# Patient Record
Sex: FEMALE | Race: BLACK OR AFRICAN AMERICAN | NOT HISPANIC OR LATINO | Employment: FULL TIME | URBAN - METROPOLITAN AREA
[De-identification: names, ages, dates, MRNs, and addresses within clinical notes are randomized per-mention and may not be internally consistent; named-entity substitution may affect disease eponyms.]

---

## 2017-11-30 ENCOUNTER — APPOINTMENT (OUTPATIENT)
Dept: GENERAL RADIOLOGY | Facility: CLINIC | Age: 32
End: 2017-11-30
Attending: EMERGENCY MEDICINE
Payer: COMMERCIAL

## 2017-11-30 ENCOUNTER — APPOINTMENT (OUTPATIENT)
Dept: CT IMAGING | Facility: CLINIC | Age: 32
End: 2017-11-30
Attending: EMERGENCY MEDICINE
Payer: COMMERCIAL

## 2017-11-30 ENCOUNTER — HOSPITAL ENCOUNTER (EMERGENCY)
Facility: CLINIC | Age: 32
Discharge: HOME | End: 2017-11-30
Attending: EMERGENCY MEDICINE | Admitting: EMERGENCY MEDICINE
Payer: COMMERCIAL

## 2017-11-30 VITALS
TEMPERATURE: 97.8 F | WEIGHT: 160 LBS | RESPIRATION RATE: 16 BRPM | OXYGEN SATURATION: 100 % | DIASTOLIC BLOOD PRESSURE: 64 MMHG | SYSTOLIC BLOOD PRESSURE: 100 MMHG

## 2017-11-30 DIAGNOSIS — T78.40XA ALLERGIC REACTION, INITIAL ENCOUNTER: ICD-10-CM

## 2017-11-30 DIAGNOSIS — N76.0 BACTERIAL VAGINOSIS: ICD-10-CM

## 2017-11-30 DIAGNOSIS — B96.89 BACTERIAL VAGINOSIS: ICD-10-CM

## 2017-11-30 DIAGNOSIS — R07.89 ATYPICAL CHEST PAIN: ICD-10-CM

## 2017-11-30 LAB
ANION GAP SERPL CALCULATED.3IONS-SCNC: 9 MMOL/L (ref 3–14)
BASOPHILS # BLD AUTO: 0.1 10E9/L (ref 0–0.2)
BASOPHILS NFR BLD AUTO: 0.5 %
BUN SERPL-MCNC: 18 MG/DL (ref 7–30)
CALCIUM SERPL-MCNC: 8.6 MG/DL (ref 8.5–10.1)
CHLORIDE SERPL-SCNC: 104 MMOL/L (ref 94–109)
CO2 SERPL-SCNC: 23 MMOL/L (ref 20–32)
CREAT SERPL-MCNC: 0.65 MG/DL (ref 0.52–1.04)
D DIMER PPP FEU-MCNC: 0.7 UG/ML FEU (ref 0–0.5)
DIFFERENTIAL METHOD BLD: NORMAL
EOSINOPHIL # BLD AUTO: 0.2 10E9/L (ref 0–0.7)
EOSINOPHIL NFR BLD AUTO: 2 %
ERYTHROCYTE [DISTWIDTH] IN BLOOD BY AUTOMATED COUNT: 12.4 % (ref 10–15)
GFR SERPL CREATININE-BSD FRML MDRD: >90 ML/MIN/1.7M2
GLUCOSE SERPL-MCNC: 77 MG/DL (ref 70–99)
HCG UR QL: NEGATIVE
HCT VFR BLD AUTO: 41.5 % (ref 35–47)
HGB BLD-MCNC: 14.4 G/DL (ref 11.7–15.7)
IMM GRANULOCYTES # BLD: 0 10E9/L (ref 0–0.4)
IMM GRANULOCYTES NFR BLD: 0.3 %
INTERNAL QC OK POCT: YES
INTERPRETATION ECG - MUSE: NORMAL
LYMPHOCYTES # BLD AUTO: 2.1 10E9/L (ref 0.8–5.3)
LYMPHOCYTES NFR BLD AUTO: 21 %
MCH RBC QN AUTO: 29.8 PG (ref 26.5–33)
MCHC RBC AUTO-ENTMCNC: 34.7 G/DL (ref 31.5–36.5)
MCV RBC AUTO: 86 FL (ref 78–100)
MONOCYTES # BLD AUTO: 0.6 10E9/L (ref 0–1.3)
MONOCYTES NFR BLD AUTO: 5.9 %
NEUTROPHILS # BLD AUTO: 6.9 10E9/L (ref 1.6–8.3)
NEUTROPHILS NFR BLD AUTO: 70.3 %
NRBC # BLD AUTO: 0 10*3/UL
NRBC BLD AUTO-RTO: 0 /100
PLATELET # BLD AUTO: 318 10E9/L (ref 150–450)
POTASSIUM SERPL-SCNC: 3.3 MMOL/L (ref 3.4–5.3)
RBC # BLD AUTO: 4.84 10E12/L (ref 3.8–5.2)
SODIUM SERPL-SCNC: 136 MMOL/L (ref 133–144)
TROPONIN I SERPL-MCNC: <0.015 UG/L (ref 0–0.04)
TROPONIN I SERPL-MCNC: <0.015 UG/L (ref 0–0.04)
WBC # BLD AUTO: 9.7 10E9/L (ref 4–11)

## 2017-11-30 PROCEDURE — 71260 CT THORAX DX C+: CPT

## 2017-11-30 PROCEDURE — 80048 BASIC METABOLIC PNL TOTAL CA: CPT | Performed by: EMERGENCY MEDICINE

## 2017-11-30 PROCEDURE — 84484 ASSAY OF TROPONIN QUANT: CPT | Performed by: EMERGENCY MEDICINE

## 2017-11-30 PROCEDURE — 25000132 ZZH RX MED GY IP 250 OP 250 PS 637: Performed by: EMERGENCY MEDICINE

## 2017-11-30 PROCEDURE — 25000128 H RX IP 250 OP 636: Performed by: EMERGENCY MEDICINE

## 2017-11-30 PROCEDURE — 71020 XR CHEST 2 VW: CPT

## 2017-11-30 PROCEDURE — 81025 URINE PREGNANCY TEST: CPT | Performed by: EMERGENCY MEDICINE

## 2017-11-30 PROCEDURE — 25000125 ZZHC RX 250: Performed by: EMERGENCY MEDICINE

## 2017-11-30 PROCEDURE — 85025 COMPLETE CBC W/AUTO DIFF WBC: CPT | Performed by: EMERGENCY MEDICINE

## 2017-11-30 PROCEDURE — 99285 EMERGENCY DEPT VISIT HI MDM: CPT | Mod: 25 | Performed by: EMERGENCY MEDICINE

## 2017-11-30 PROCEDURE — 85379 FIBRIN DEGRADATION QUANT: CPT | Performed by: EMERGENCY MEDICINE

## 2017-11-30 PROCEDURE — 93005 ELECTROCARDIOGRAM TRACING: CPT | Performed by: EMERGENCY MEDICINE

## 2017-11-30 PROCEDURE — 96374 THER/PROPH/DIAG INJ IV PUSH: CPT | Mod: 59 | Performed by: EMERGENCY MEDICINE

## 2017-11-30 RX ORDER — IOPAMIDOL 755 MG/ML
100 INJECTION, SOLUTION INTRAVASCULAR ONCE
Status: COMPLETED | OUTPATIENT
Start: 2017-11-30 | End: 2017-11-30

## 2017-11-30 RX ORDER — EPINEPHRINE 0.3 MG/.3ML
0.3 INJECTION SUBCUTANEOUS
Qty: 0.6 ML | Refills: 0 | Status: SHIPPED | OUTPATIENT
Start: 2017-11-30

## 2017-11-30 RX ORDER — DIPHENHYDRAMINE HCL 50 MG
50 CAPSULE ORAL ONCE
Status: COMPLETED | OUTPATIENT
Start: 2017-11-30 | End: 2017-11-30

## 2017-11-30 RX ORDER — DIPHENHYDRAMINE HCL 25 MG
25-50 TABLET ORAL EVERY 6 HOURS PRN
Qty: 56 TABLET | Refills: 0 | Status: SHIPPED | OUTPATIENT
Start: 2017-11-30

## 2017-11-30 RX ORDER — CLINDAMYCIN HCL 300 MG
300 CAPSULE ORAL 2 TIMES DAILY
Qty: 14 CAPSULE | Refills: 0 | Status: SHIPPED | OUTPATIENT
Start: 2017-11-30 | End: 2017-12-07

## 2017-11-30 RX ORDER — KETOROLAC TROMETHAMINE 30 MG/ML
30 INJECTION, SOLUTION INTRAMUSCULAR; INTRAVENOUS ONCE
Status: COMPLETED | OUTPATIENT
Start: 2017-11-30 | End: 2017-11-30

## 2017-11-30 RX ADMIN — IOPAMIDOL 54 ML: 755 INJECTION, SOLUTION INTRAVENOUS at 05:46

## 2017-11-30 RX ADMIN — SODIUM CHLORIDE 75 ML: 9 INJECTION, SOLUTION INTRAVENOUS at 05:47

## 2017-11-30 RX ADMIN — KETOROLAC TROMETHAMINE 30 MG: 30 INJECTION, SOLUTION INTRAMUSCULAR at 03:32

## 2017-11-30 RX ADMIN — DIPHENHYDRAMINE HYDROCHLORIDE 50 MG: 50 CAPSULE ORAL at 01:51

## 2017-11-30 ASSESSMENT — ENCOUNTER SYMPTOMS
VOMITING: 0
FEVER: 0
NAUSEA: 1
COUGH: 0
SHORTNESS OF BREATH: 0

## 2017-11-30 NOTE — ED NOTES
Started around 2030 with rash and felt like tongue was swollen and itching inside of mouth.  Also noticed increased work of breathing and chest pain.   Just started Flagyl for pelvic infection.

## 2017-11-30 NOTE — ED PROVIDER NOTES
History   No chief complaint on file.    HPI  Gary Thakkar is a 32 year old female who presents to the Emergency Department  today with concern for allergic reaction.  She states around 8:30 PM, approximately 5 hours prior to my seeing her, she suddenly felt itching and in her tongue and throat, as well as diffusely all over her body.  She states she developed a diffuse bodily rash.  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  She denies any new chemical exposures tonight.  However, she did just take metronidazole at 7 PM tonight for the first time, for what sounds like a bacterial vaginosis vaginal infection.  She states she also developed a little bit of left-sided chest pain and some nausea. She attempted to vomit but had dry heaves.  She states that the rash is nearly completely gone, and the itching dramatically improved. She states her throat feels back to normal as does the mouth.  However, the chest pain and continues mildly.  No shortness of breath, coughing, or fever.  No other complaints this time.    This part of the medical record was transcribed by Patti Su Scribe, from a dictation done by Loren Babb MD.     PAST MEDICAL HISTORY  Past Medical History:   Diagnosis Date     Migraines      PAST SURGICAL HISTORY  History reviewed. No pertinent surgical history.  FAMILY HISTORY  No family history on file.  SOCIAL HISTORY  Social History   Substance Use Topics     Smoking status: Never Smoker     Smokeless tobacco: Never Used     Alcohol use Yes      Comment: socially     MEDICATIONS  No current facility-administered medications for this encounter.      Current Outpatient Prescriptions   Medication     TOPIRAMATE PO     METRONIDAZOLE PO     clindamycin (CLEOCIN) 300 MG capsule     diphenhydrAMINE (BENADRYL) 25 MG tablet     EPINEPHrine (EPIPEN/ADRENACLICK/OR ANY BX GENERIC EQUIV) 0.3 MG/0.3ML injection 2-pack     ALLERGIES  No Known Allergies      I have reviewed the  Medications, Allergies, Past Medical and Surgical History, and Social History in the Epic system.    Review of Systems   Constitutional: Negative for fever.   Respiratory: Negative for cough and shortness of breath.    Cardiovascular: Positive for chest pain (mild chest discomfort).   Gastrointestinal: Positive for nausea. Negative for vomiting.   Skin: Positive for rash (diffuse all over body; now nearly completely resolved).   All other systems reviewed and are negative.      Physical Exam   BP: 119/80  Heart Rate: 104  Temp: 98  F (36.7  C)  Resp: 20  Weight: 72.6 kg (160 lb)  SpO2: 97 %      Physical Exam   Constitutional: No distress.   HENT:   Head: Atraumatic.   Mouth/Throat: Oropharynx is clear and moist. No oropharyngeal exudate.   Eyes: Pupils are equal, round, and reactive to light. No scleral icterus.   Cardiovascular: Normal heart sounds and intact distal pulses.    Pulmonary/Chest: Breath sounds normal. No respiratory distress. She exhibits tenderness.       Abdominal: Soft. Bowel sounds are normal. There is no tenderness.   Musculoskeletal: She exhibits no edema or tenderness.   Skin: Skin is warm. Rash (very faint (minimally visible) splotchy erythema on trunk) noted. She is not diaphoretic.       ED Course     ED Course     Procedures             EKG Interpretation:      Interpreted by Loren Babb  Time reviewed: 0100  Symptoms at time of EKG: chest pain   Rhythm: normal sinus   Rate: 92  Axis: Normal  Ectopy: none  Conduction: normal  ST Segments/ T Waves: T wave inversion V2  Q Waves: isolated to lead III  Comparison to prior: No old EKG available    Clinical Impression: NSR, S1/Q3/T3, as well as T wave inversion in V2                  Critical Care time:  none           Labs Ordered and Resulted from Time of ED Arrival Up to the Time of Departure from the ED   BASIC METABOLIC PANEL - Abnormal; Notable for the following:        Result Value    Potassium 3.3 (*)     All other components  within normal limits   D DIMER QUANTITATIVE - Abnormal; Notable for the following:     D Dimer 0.7 (*)     All other components within normal limits   HCG QUAL URINE POCT - Normal   CBC WITH PLATELETS DIFFERENTIAL   TROPONIN I   TROPONIN I            Assessments & Plan (with Medical Decision Making)   The patient states that the itching in her throat is gone.  Her oropharyngeal exam is normal.  She has a barely visible blotchy rash, that I feel is likely the remnants of some hives.  She states it was much worse prior to coming in.  He seems to be going away without doing anything.  I did give her some Benadryl with complete resolution of her symptoms.  She initially came in minimally tachycardia, this resolved.  She also did have some left-sided chest pain which she stated was nonpleuritic.  It was reproducible with palpation.  Chest x-ray was done which was unremarkable.  She has no cardiac risk factors, as she has no underlying medical history with exception of migraines, and is a nonsmoker.  She does not use OCPs, but does use Depo-Provera Provera for contraception.  She does state she's had some bilateral calf discomfort recently.  No history of DVT or PE, recent surgery, or travel.  EKG was done which showed T-wave inversion isolated in V2, with some flattening in V3 area and she does have an S1, Q3, T3.  Because of this, d-dimer was ordered, and it was positive at 0.7. Because of this, CT was done, which was neg. She will be d/c home at this point.  Chest pain is much improved after a dose of Toradol here.  My concern for serious cause for her chest pain at this point is very low.  She'll be given a prescription for Benadryl, as well as an EpiPen.  She is instructed on indications for use of EpiPen, as well as the fact that she needs to return to the ER should she feel she has to use this.  She is directed to follow up with her clinic doctor within the next few days.  I will discontinue the metronidazole and  change this to oral clindamycin, as it certainly is possible the metronidazole has caused the rash.  The patient verbalizes understanding and is agreeable with plan.    I have reviewed the nursing notes.    I have reviewed the findings, diagnosis, plan and need for follow up with the patient.    New Prescriptions    CLINDAMYCIN (CLEOCIN) 300 MG CAPSULE    Take 1 capsule (300 mg) by mouth 2 times daily for 7 days    DIPHENHYDRAMINE (BENADRYL) 25 MG TABLET    Take 1-2 tablets (25-50 mg) by mouth every 6 hours as needed for itching or other (rash)    EPINEPHRINE (EPIPEN/ADRENACLICK/OR ANY BX GENERIC EQUIV) 0.3 MG/0.3ML INJECTION 2-PACK    Inject 0.3 mLs (0.3 mg) into the muscle once as needed for anaphylaxis       Final diagnoses:   Allergic reaction, initial encounter   Atypical chest pain   Bacterial vaginosis       11/30/2017   [facility], [place], EMERGENCY DEPARTMENT     Loren Babb MD  11/30/17 0616

## 2017-11-30 NOTE — DISCHARGE INSTRUCTIONS
Stop the metronidazole, and start the clindamycin for your infection. If your itching or rash returns, use the benadryl. Use the epipen only if you have swelling in the mouth or throat, or feel short of breath. Return to the ER if you have any worsening or concerns, or if you need to use the epipen. See your doctor in 1-4 days.

## 2017-11-30 NOTE — ED AVS SNAPSHOT
[facility], [place], Emergency Department    [address]    [place] [address]    Phone:  [phone]    Fax:  [phone]                                       Gary Thakkar   MRN: [id number]    Department:  [facility], Emergency Department   Date of Visit:  11/30/2017           After Visit Summary Signature Page     I have received my discharge instructions, and my questions have been answered. I have discussed any challenges I see with this plan with the nurse or doctor.    ..........................................................................................................................................  Patient/Patient Representative Signature      ..........................................................................................................................................  Patient Representative Print Name and Relationship to Patient    ..................................................               ................................................  Date                                            Time    ..........................................................................................................................................  Reviewed by Signature/Title    ...................................................              ..............................................  Date                                                            Time

## 2018-06-04 ENCOUNTER — MEDICAL CORRESPONDENCE (OUTPATIENT)
Dept: HEALTH INFORMATION MANAGEMENT | Facility: CLINIC | Age: 33
End: 2018-06-04

## 2022-05-13 ENCOUNTER — APPOINTMENT (OUTPATIENT)
Dept: ULTRASOUND IMAGING | Facility: HOSPITAL | Age: 37
End: 2022-05-13
Payer: COMMERCIAL

## 2022-05-13 ENCOUNTER — HOSPITAL ENCOUNTER (EMERGENCY)
Facility: HOSPITAL | Age: 37
Discharge: HOME | End: 2022-05-13
Admitting: PHYSICIAN ASSISTANT
Payer: COMMERCIAL

## 2022-05-13 VITALS
OXYGEN SATURATION: 96 % | DIASTOLIC BLOOD PRESSURE: 74 MMHG | HEART RATE: 76 BPM | WEIGHT: 152 LBS | BODY MASS INDEX: 27.8 KG/M2 | RESPIRATION RATE: 18 BRPM | TEMPERATURE: 98.7 F | SYSTOLIC BLOOD PRESSURE: 118 MMHG

## 2022-05-13 DIAGNOSIS — N64.4 BREAST PAIN: ICD-10-CM

## 2022-05-13 LAB
C REACTIVE PROTEIN LHE: 0.3 MG/DL (ref 0–0.8)
ERYTHROCYTE [DISTWIDTH] IN BLOOD BY AUTOMATED COUNT: 12.7 % (ref 10–15)
HCG UR QL: NEGATIVE
HCT VFR BLD AUTO: 40.3 % (ref 35–47)
HGB BLD-MCNC: 14 G/DL (ref 11.7–15.7)
MCH RBC QN AUTO: 30.4 PG (ref 26.5–33)
MCHC RBC AUTO-ENTMCNC: 34.7 G/DL (ref 31.5–36.5)
MCV RBC AUTO: 88 FL (ref 78–100)
PLATELET # BLD AUTO: 272 10E3/UL (ref 150–450)
RBC # BLD AUTO: 4.6 10E6/UL (ref 3.8–5.2)
WBC # BLD AUTO: 9.1 10E3/UL (ref 4–11)

## 2022-05-13 PROCEDURE — 76642 ULTRASOUND BREAST LIMITED: CPT | Mod: RT

## 2022-05-13 PROCEDURE — 85027 COMPLETE CBC AUTOMATED: CPT | Performed by: PHYSICIAN ASSISTANT

## 2022-05-13 PROCEDURE — 81025 URINE PREGNANCY TEST: CPT | Performed by: EMERGENCY MEDICINE

## 2022-05-13 PROCEDURE — 36415 COLL VENOUS BLD VENIPUNCTURE: CPT | Performed by: PHYSICIAN ASSISTANT

## 2022-05-13 PROCEDURE — 86140 C-REACTIVE PROTEIN: CPT | Performed by: PHYSICIAN ASSISTANT

## 2022-05-13 PROCEDURE — 99284 EMERGENCY DEPT VISIT MOD MDM: CPT | Mod: 25

## 2022-05-13 NOTE — ED TRIAGE NOTES
C/o right sided breast pain since yesterday morning and noted small lump on breast. Denies any leakage.

## 2022-05-13 NOTE — ED PROVIDER NOTES
ED PROVIDER NOTE    EMERGENCY DEPARTMENT ENCOUNTER      NAME: Gary Lamar  AGE: 37 year old female  YOB: 1985  MRN: [id number]  EVALUATION DATE & TIME: No admission date for patient encounter.    PCP: Malik Bhatia    ED PROVIDER: Elvia Lagunas PA-C      Chief Complaint   Patient presents with     Breast Mass         FINAL IMPRESSION:  1. Breast pain          MEDICAL DECISION MAKING:    Pertinent Labs & Imaging studies reviewed. (See chart for details)  37 year old female who is otherwise healthy presenting with right breast pain that started yesterday morning.  She feels like she may have a fever as well.  She has taken Tylenol which just help the fever.    Here vitals are stable, she is afebrile.  On examination she has firm, tenderness over the medial aspect of the right breast.  There is no redness, swelling or nipple discharge.    No clear signs of infection on examination however given the amount of pain and her subjective fevers, did check a CBC and a CRP and obtained a breast ultrasound.    She has normal white count and normal CRP.  Ultrasound without any signs of abscess or other findings in the area of tenderness.    Patient stable for discharge home at this time.  Encourage follow-up with her PCP for further work-up.    At the conclusion of the encounter I discussed the results of all of the tests and the disposition. The questions were answered. The patient or family acknowledged understanding and was agreeable with the care plan.       ED COURSE  6:00 PM  Met and evaluated patient. Discussed ED plan.   8:22 PM Updated on results and plan for discharge      MEDICATIONS GIVEN IN THE EMERGENCY:  Medications - No data to display    NEW PRESCRIPTIONS STARTED AT TODAY'S ER VISIT  New Prescriptions    No medications on file          =================================================================    HPI    Patient information was obtained from: Patient    Gary Lamar is a 37 year old  female who presents with right breast pain.  Symptoms started yesterday morning.  Pain is constant.  She reports an associated subjective fever and occasional dizziness.  Has not had any symptoms like this before.  She denies any nipple drainage.  Denies any chance of pregnancy.  Last menstrual period was 2 weeks ago.  No family history of cancers.     She tried Tylenol which seemed to help with her subjective fever but not with the breast pain.      REVIEW OF SYSTEMS   Constitutional: Negative for fevers, chills.  Pulmonary: Negative for shortness of breath  Cardiac: Negative for chest pain  GI:Negative for nausea, vomiting, diarrhea, abdominal pain  : Negative for urinary symptoms  Breast: + breast pain  Musculoskeletal: Negative for extremity pain  Skin: Negative for skin changes  Neuro: Negative for weakness, numbness    All other systems reviewed and are negative        PAST MEDICAL HISTORY:  Past Medical History:   Diagnosis Date     Migraines        PAST SURGICAL HISTORY:  No past surgical history on file.        CURRENT MEDICATIONS:    No current facility-administered medications for this encounter.    Current Outpatient Medications:      diphenhydrAMINE (BENADRYL) 25 MG tablet, Take 1-2 tablets (25-50 mg) by mouth every 6 hours as needed for itching or other (rash), Disp: 56 tablet, Rfl: 0     EPINEPHrine (EPIPEN/ADRENACLICK/OR ANY BX GENERIC EQUIV) 0.3 MG/0.3ML injection 2-pack, Inject 0.3 mLs (0.3 mg) into the muscle once as needed for anaphylaxis, Disp: 0.6 mL, Rfl: 0     METRONIDAZOLE PO, Take 500 mg by mouth 2 times daily, Disp: , Rfl:      TOPIRAMATE PO, Take 50 mg by mouth daily, Disp: , Rfl:     ALLERGIES:  Allergies   Allergen Reactions     Sumatriptan Other (See Comments)     Chest pain     Metronidazole Rash     Presented to ED 1 hr after taking, see care everywhere 11/2017       FAMILY HISTORY:  No family history on file.    SOCIAL HISTORY:   Smoking: Denies  Alcohol:  Denies    VITALS:  Vitals:    05/13/22 1601   BP: 118/74   Pulse: 76   Resp: 18   Temp: 98.7  F (37.1  C)   TempSrc: Oral   SpO2: 96%   Weight: 68.9 kg (152 lb)       PHYSICAL EXAM    General Appearance:  Alert, cooperative, no distress, appears stated age  HENT: Normocephalic without obvious deformity, atraumatic. Mucous membranes moist   Eyes: Conjunctiva clear, Lids normal. No discharge.   Respiratory: No distress. Lungs clear to ausculation bilaterally. No wheezes, rhonchi or stridor  Cardiovascular: Regular rate and rhythm, no murmur. Normal cap refill. No peripheral edema  Breast: firm, tenderness over the medial aspect of the right breast.  There is no redness, swelling or nipple discharge.  GI: Abdomen soft, nontender  Musculoskeletal: Moving all extremities. No gross deformities  Integument: Warm, dry, no rashes or lesions  Neurologic: Alert and orientated x3. No focal deficits.  Psych: Normal mood and affect        LAB:  Labs Ordered and Resulted from Time of ED Arrival to Time of ED Departure   HCG QUALITATIVE URINE - Normal       Result Value    hCG Urine Qualitative Negative     CBC WITH PLATELETS - Normal    WBC Count 9.1      RBC Count 4.60      Hemoglobin 14.0      Hematocrit 40.3      MCV 88      MCH 30.4      MCHC 34.7      RDW 12.7      Platelet Count 272     CRP INFLAMMATION - Normal    CRP 0.3         RADIOLOGY:  US Breast Right Limited 1-3 Quadrants   Preliminary Result   IMPRESSION:       No ultrasound findings to suggest breast abscess.              Elvia Lagunas PA-C   Emergency Medicine       Elvia Lagunas PA-C  05/14/22 0059

## 2022-05-13 NOTE — ED NOTES
ED Provider In Triage Note  [facility]  Encounter Date: May 13, 2022    Chief Complaint   Patient presents with     Breast Mass       Brief HPI:   Gary Lamar is a 37 year old female presenting to the Emergency Department with a chief complaint of right breast lump that she first noticed yesterday. No discharge, not breastfeeding, no fever.    Brief Physical Exam:  /74   Pulse 76   Temp 98.7  F (37.1  C) (Oral)   Resp 18   Wt 68.9 kg (152 lb)   LMP 05/01/2022 (Approximate)   SpO2 96%   BMI 27.80 kg/m    General: Non-toxic appearing  HEENT: Atraumatic  Resp: No respiratory distress  Abdomen: Non-peritoneal  Neuro: Alert, oriented, answers questions appropriately  Psych: Behavior appropriate      Plan Initiated in Triage:  Orders Placed This Encounter     HCG qualitative urine       PIT Dispo:   Return to lobby while awaiting workup and ED bed availability    Sheryl Camacho MD on 5/13/2022 at 4:05 PM    Patient was evaluated by the Physician in Triage due to a limitation of available rooms in the Emergency Department. A plan of care was discussed based on the information obtained on the initial evaluation and patient was consuled to return back to the Emergency Department lobby after this initial evalutaiton until results were obtained or a room became available in the Emergency Department. Patient was counseled not to leave prior to receiving the results of their workup.     Sheryl Camacho MD  [facility] EMERGENCY DEPARTMENT  [address] [place] [id number]  [phone]     Sheryl Camacho MD  05/13/22 6009

## 2022-05-14 NOTE — DISCHARGE INSTRUCTIONS
Your ultrasound and lab work do not show any signs of infection.  Would recommend tylenol or ibuprofen as needed for pain and scheduling a follow up with your primary care provider for further workup and evaluation.    If you notice increased swelling, redness or you have fevers or more severe pain, return to the ER

## 2022-05-14 NOTE — ED NOTES
Pt discharge ambulatory to home at 2100 by self. All questions answered. Expressed understanding of info

## 2023-10-10 NOTE — ED AVS SNAPSHOT
[facility], Emergency Department    [address]    [place] [address]    Phone:  [phone]    Fax:  [phone]                                       Gary Thakkar   MRN: [id number]    Department:  [facility], Emergency Department   Date of Visit:  11/30/2017           Patient Information     Date Of Birth          1985        Your diagnoses for this visit were:     Allergic reaction, initial encounter     Atypical chest pain     Bacterial vaginosis        You were seen by Loren Babb MD.        Discharge Instructions       Stop the metronidazole, and start the clindamycin for your infection. If your itching or rash returns, use the benadryl. Use the epipen only if you have swelling in the mouth or throat, or feel short of breath. Return to the ER if you have any worsening or concerns, or if you need to use the epipen. See your doctor in 1-4 days.    Discharge References/Attachments     HIVES (ADULT) (ENGLISH)      24 Hour Appointment Hotline       To make an appointment at any [facility] clinic, call [phone] ([phone]). If you don't have a family doctor or clinic, we will help you find one. [facility] clinics are conveniently located to serve the needs of you and your family.             Review of your medicines      START taking        Dose / Directions Last dose taken    clindamycin 300 MG capsule   Commonly known as:  CLEOCIN   Dose:  300 mg   Quantity:  14 capsule        Take 1 capsule (300 mg) by mouth 2 times daily for 7 days   Refills:  0        diphenhydrAMINE 25 MG tablet   Commonly known as:  BENADRYL   Dose:  25-50 mg   Quantity:  56 tablet        Take 1-2 tablets (25-50 mg) by mouth every 6 hours as needed for itching or other (rash)   Refills:  0        EPINEPHrine 0.3 MG/0.3ML injection 2-pack   Commonly known as:  EPIPEN/ADRENACLICK/or ANY BX GENERIC EQUIV   Dose:  0.3 mg   Quantity:  0.6 mL        Inject 0.3 mLs (0.3 mg) into the muscle once as needed for  anaphylaxis   Refills:  0          Our records show that you are taking the medicines listed below. If these are incorrect, please call your family doctor or clinic.        Dose / Directions Last dose taken    METRONIDAZOLE PO   Dose:  500 mg        Take 500 mg by mouth 2 times daily   Refills:  0        TOPIRAMATE PO   Dose:  50 mg   Indication:  Hasn't started yet        Take 50 mg by mouth daily   Refills:  0                Prescriptions were sent or printed at these locations (3 Prescriptions)                   Other Prescriptions                Printed at Department/Unit printer (3 of 3)         clindamycin (CLEOCIN) 300 MG capsule               diphenhydrAMINE (BENADRYL) 25 MG tablet               EPINEPHrine (EPIPEN/ADRENACLICK/OR ANY BX GENERIC EQUIV) 0.3 MG/0.3ML injection 2-pack                Procedures and tests performed during your visit     Procedure/Test Number of Times Performed    Basic metabolic panel 1    CBC with platelets differential 1    Chest CT, IV contrast only - PE protocol 1    Chest XR,  PA & LAT 1    D dimer quantitative 1    EKG 12 lead 1    Troponin I 2    hCG qual urine POCT 1      Orders Needing Specimen Collection     None      Pending Results     Date and Time Order Name Status Description    11/30/2017 0504 Chest CT, IV contrast only - PE protocol Preliminary             Pending Culture Results     No orders found from 11/28/2017 to 12/1/2017.            Pending Results Instructions     If you had any lab results that were not finalized at the time of your Discharge, you can call the ED Lab Result RN at [phone]. You will be contacted by this team for any positive Lab results or changes in treatment. The nurses are available 7 days a week from 10A to 6:30P.  You can leave a message 24 hours per day and they will return your call.        Thank you for choosing [facility]       Thank you for choosing [facility] for your care. Our goal is always to provide you with excellent care.  "Hearing back from our patients is one way we can continue to improve our services. Please take a few minutes to complete the written survey that you may receive in the mail after you visit with us. Thank you!        [employer] Information     [employer] lets you send messages to your doctor, view your test results, renew your prescriptions, schedule appointments and more. To sign up, go to [email or web address] . Click on \"Log in\" on the left side of the screen, which will take you to the Welcome page. Then click on \"Sign up Now\" on the right side of the page.     You will be asked to enter the access code listed below, as well as some personal information. Please follow the directions to create your username and password.     Your access code is: [email or web address]  Expires: 2018  6:12 AM     Your access code will  in 90 days. If you need help or a new code, please call your [facility] clinic or [phone].        Care EveryWhere ID     This is your Care EveryWhere ID. This could be used by other organizations to access your [facility] medical records  [id number]        Equal Access to Services     PAT RUSH : Hadii demetrius Reid, michel rodriguez, hodan lu, viviane abernathy . So [facility] [phone].    ATENCIÓN: Si habla español, tiene a santos disposición servicios gratuitos de asistencia lingüística. Llame al [phone].    We comply with applicable federal civil rights laws and [place] laws. We do not discriminate on the basis of race, color, national origin, age, disability, sex, sexual orientation, or gender identity.            After Visit Summary       This is your record. Keep this with you and show to your community pharmacist(s) and doctor(s) at your next visit.                  " No